# Patient Record
Sex: FEMALE | Race: WHITE | URBAN - NONMETROPOLITAN AREA
[De-identification: names, ages, dates, MRNs, and addresses within clinical notes are randomized per-mention and may not be internally consistent; named-entity substitution may affect disease eponyms.]

---

## 2018-11-05 ENCOUNTER — APPOINTMENT (RX ONLY)
Dept: URBAN - NONMETROPOLITAN AREA CLINIC 1 | Facility: CLINIC | Age: 63
Setting detail: DERMATOLOGY
End: 2018-11-05

## 2018-11-05 DIAGNOSIS — T88.7XX: ICD-10-CM

## 2018-11-05 PROBLEM — K21.9 GASTRO-ESOPHAGEAL REFLUX DISEASE WITHOUT ESOPHAGITIS: Status: ACTIVE | Noted: 2018-11-05

## 2018-11-05 PROBLEM — T88.7XXA UNSPECIFIED ADVERSE EFFECT OF DRUG OR MEDICAMENT, INITIAL ENCOUNTER: Status: ACTIVE | Noted: 2018-11-05

## 2018-11-05 PROCEDURE — ? PRESCRIPTION

## 2018-11-05 PROCEDURE — ? ORDER TESTS

## 2018-11-05 PROCEDURE — 99213 OFFICE O/P EST LOW 20 MIN: CPT

## 2018-11-05 PROCEDURE — ? COUNSELING

## 2018-11-05 PROCEDURE — ? ADDITIONAL NOTES

## 2018-11-05 RX ORDER — CLOBETASOL PROPIONATE 0.5 MG/G
CREAM TOPICAL
Qty: 1 | Refills: 3 | Status: ERX | COMMUNITY
Start: 2018-11-05

## 2018-11-05 RX ADMIN — CLOBETASOL PROPIONATE: 0.5 CREAM TOPICAL at 17:04

## 2018-11-05 ASSESSMENT — LOCATION SIMPLE DESCRIPTION DERM
LOCATION SIMPLE: RIGHT THIGH
LOCATION SIMPLE: ABDOMEN
LOCATION SIMPLE: RIGHT PRETIBIAL REGION
LOCATION SIMPLE: RIGHT KNEE

## 2018-11-05 ASSESSMENT — LOCATION DETAILED DESCRIPTION DERM
LOCATION DETAILED: RIGHT PROXIMAL PRETIBIAL REGION
LOCATION DETAILED: RIGHT KNEE
LOCATION DETAILED: PERIUMBILICAL SKIN
LOCATION DETAILED: RIGHT ANTERIOR DISTAL THIGH

## 2018-11-05 ASSESSMENT — LOCATION ZONE DERM
LOCATION ZONE: LEG
LOCATION ZONE: TRUNK

## 2020-11-11 NOTE — PROCEDURE: ORDER TESTS
I am seeing this patient today virtually using HIPAA-compliant video-conferencing technology due to the COVID-19 Pandemic. The patient has previously provided full consent to use this technology and understands the risks and benefits of proceeding. I am seeing the patient today from my office in [place], [place]. The patient is in his/her home located within [place]. Patient already made aware that this is a virtual visit with provider and that for the purposes of billing, we will submit a claim for reimbursement with your insurance company. He/She was informed that he/she will be responsible for any copays, coinsurance amounts or other amounts not covered by his/her insurance company. Patient consented and accepted terms of virtual visit. THIS VISIT WAS CONDUCTED OVER THE TELEPHONE    HPI    Margareth Akhtar is a 55 y.o. female and presents today for Anxiety and Depression  Reports that she has been going through a lot recently in her personal life; she endorses major stress, anxiety and sadness at times. She denies thoughts of suicide today. Allergies    No Known Allergies     Medications    Current Outpatient Medications   Medication Sig Dispense    busPIRone (BUSPAR) 7.5 mg tablet Take 1 Tab by mouth two (2) times a day for 30 days. 60 Tab    FLUoxetine (PROzac) 20 mg capsule Take 1 Cap by mouth nightly. 30 Cap    omeprazole (PRILOSEC) 40 mg capsule TAKE ONE CAPSULE BY MOUTH DAILY 30 Cap    ondansetron (ZOFRAN ODT) 8 mg disintegrating tablet PLACE 1 TABLET ON TOP OF TONGUE WHERE IT WILL DISSOLVE, THEN SWALLOW THREE TIMES DAILY FOR 10 DAYS 30 Tab    topiramate (TOPAMAX) 25 mg tablet Take 1 Tab by mouth daily. 90 Tab     No current facility-administered medications for this visit.          Screening  On the basis of positive PHQ-9 screening (PHQ 9 Score: 24), C-SSRS completed and medication was prescribed, drug therapy education given - patient will call for any significant medication side effects or worsening symptoms of depression. Patient will follow-up in 1 month. CRISTOFER Screeninpts    Health Maintenance    Health Maintenance Due   Topic Date Due    DTaP/Tdap/Td series (1 - Tdap) 1995    PAP AKA CERVICAL CYTOLOGY  1995    Lipid Screen  2014    Flu Vaccine (1) 2020        Problem List    Patient Active Problem List    Diagnosis Date Noted    CRISTOFER (generalized anxiety disorder) 2020    Major depressive disorder with current active episode 2020        Family Hx    Family History   Problem Relation Age of Onset    No Known Problems Mother     Alcohol abuse Father         Social Hx    Social History     Socioeconomic History    Marital status:      Spouse name: Not on file    Number of children: Not on file    Years of education: Not on file    Highest education level: Not on file   Tobacco Use    Smoking status: Never Smoker    Smokeless tobacco: Never Used   Substance and Sexual Activity    Alcohol use: Never     Frequency: Never     Comment: social        Surgical Hx    History reviewed. No pertinent surgical history. Vitals    There were no vitals taken for this visit. No flowsheet data found. ROS    Review of Systems   Constitutional: Negative for chills, fever and malaise/fatigue. HENT: Negative for congestion, ear pain, sinus pain and sore throat. Eyes: Negative for blurred vision and redness. Respiratory: Negative for cough, sputum production, shortness of breath and wheezing. Cardiovascular: Positive for chest pain. Negative for palpitations and leg swelling. Gastrointestinal: Negative for abdominal pain, constipation, diarrhea, heartburn, nausea and vomiting. Genitourinary: Negative for dysuria and hematuria. Musculoskeletal: Negative for falls, joint pain and myalgias. Skin: Negative for rash. Neurological: Negative for dizziness, seizures, weakness and headaches.    Endo/Heme/Allergies: Does not bruise/bleed easily. Psychiatric/Behavioral: Positive for depression. Negative for suicidal ideas. The patient is nervous/anxious. The patient does not have insomnia. Physical Exam    Patient is a 55y.o. year old female     Physical exam was deferred due to [place]- 19 precautions as visit was completed via telemedicine. Assessment/Plan  1. Major depressive disorder with current active episode, unspecified depression episode severity, unspecified whether recurrent  PHQ9/CRISTOFER positive; will start prozac and buspirone; medication side effects discussed with patient; stop meds immediately for major mood changes or SI/HI; f/u 1mo  - FLUoxetine (PROzac) 20 mg capsule; Take 1 Cap by mouth nightly. Dispense: 30 Cap; Refill: 0    2. CRISTOFER (generalized anxiety disorder)  PHQ9/CRISTOFER positive; will start prozac and buspirone; medication side effects discussed with patient; stop meds immediately for major mood changes or SI/HI; f/u 1mo  - busPIRone (BUSPAR) 7.5 mg tablet; Take 1 Tab by mouth two (2) times a day for 30 days. Dispense: 60 Tab; Refill: 0    3. Anxiety  PHQ9/CRISTOFER positive; will check bloodwork and ekg to rule out other causes; will start prozac and buspirone; medication side effects discussed with patient; stop meds immediately for major mood changes or SI/HI; f/u 1mo  - EKG, 12 LEAD, INITIAL; Future  - METABOLIC PANEL, COMPREHENSIVE; Future  - TSH 3RD GENERATION; Future  - CBC W/O DIFF; Future    4. Chest pain, unspecified type  Reports one incident of chest pain that resolved within a few minutes; will check labs and ekg  - EKG, 12 LEAD, INITIAL; Future  - METABOLIC PANEL, COMPREHENSIVE; Future  - TSH 3RD GENERATION; Future  - CBC W/O DIFF; Future       Health Maintenance Items reviewed with patient as noted. 21 minutes spent with patient/reviewing records during virtual appt discussing diagnoses, treatment options, and answering any patient questions.     Camilla Nails, ROSALIA, FNP-BC Bill For Surgical Tray: no